# Patient Record
Sex: MALE | Race: WHITE | NOT HISPANIC OR LATINO | URBAN - METROPOLITAN AREA
[De-identification: names, ages, dates, MRNs, and addresses within clinical notes are randomized per-mention and may not be internally consistent; named-entity substitution may affect disease eponyms.]

---

## 2018-02-27 PROBLEM — Z00.00 ENCOUNTER FOR PREVENTIVE HEALTH EXAMINATION: Status: ACTIVE | Noted: 2018-02-27

## 2021-06-06 ENCOUNTER — EMERGENCY (EMERGENCY)
Facility: HOSPITAL | Age: 51
LOS: 1 days | Discharge: HOME | End: 2021-06-06
Attending: EMERGENCY MEDICINE | Admitting: EMERGENCY MEDICINE
Payer: COMMERCIAL

## 2021-06-06 VITALS
OXYGEN SATURATION: 98 % | DIASTOLIC BLOOD PRESSURE: 80 MMHG | HEIGHT: 64 IN | TEMPERATURE: 98 F | WEIGHT: 229.94 LBS | RESPIRATION RATE: 18 BRPM | HEART RATE: 78 BPM | SYSTOLIC BLOOD PRESSURE: 126 MMHG

## 2021-06-06 PROCEDURE — 71046 X-RAY EXAM CHEST 2 VIEWS: CPT

## 2021-06-06 PROCEDURE — 72100 X-RAY EXAM L-S SPINE 2/3 VWS: CPT

## 2021-06-06 PROCEDURE — 72040 X-RAY EXAM NECK SPINE 2-3 VW: CPT

## 2021-06-06 PROCEDURE — 99284 EMERGENCY DEPT VISIT MOD MDM: CPT

## 2021-06-06 PROCEDURE — 72100 X-RAY EXAM L-S SPINE 2/3 VWS: CPT | Mod: 26

## 2021-06-06 PROCEDURE — 99284 EMERGENCY DEPT VISIT MOD MDM: CPT | Mod: 25

## 2021-06-06 PROCEDURE — 93005 ELECTROCARDIOGRAM TRACING: CPT

## 2021-06-06 PROCEDURE — 71046 X-RAY EXAM CHEST 2 VIEWS: CPT | Mod: 26

## 2021-06-06 PROCEDURE — 72040 X-RAY EXAM NECK SPINE 2-3 VW: CPT | Mod: 26

## 2021-06-06 RX ORDER — IBUPROFEN 200 MG
1 TABLET ORAL
Qty: 30 | Refills: 0
Start: 2021-06-06 | End: 2021-06-15

## 2021-06-06 RX ORDER — IBUPROFEN 200 MG
600 TABLET ORAL ONCE
Refills: 0 | Status: COMPLETED | OUTPATIENT
Start: 2021-06-06 | End: 2021-06-06

## 2021-06-06 RX ORDER — CYCLOBENZAPRINE HYDROCHLORIDE 10 MG/1
1 TABLET, FILM COATED ORAL
Qty: 21 | Refills: 0
Start: 2021-06-06 | End: 2021-06-12

## 2021-06-06 RX ORDER — CYCLOBENZAPRINE HYDROCHLORIDE 10 MG/1
10 TABLET, FILM COATED ORAL ONCE
Refills: 0 | Status: COMPLETED | OUTPATIENT
Start: 2021-06-06 | End: 2021-06-06

## 2021-06-06 RX ADMIN — Medication 600 MILLIGRAM(S): at 11:54

## 2021-06-06 RX ADMIN — CYCLOBENZAPRINE HYDROCHLORIDE 10 MILLIGRAM(S): 10 TABLET, FILM COATED ORAL at 11:55

## 2021-06-06 RX ADMIN — Medication 600 MILLIGRAM(S): at 12:40
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
## 2021-06-06 NOTE — ED PROVIDER NOTE - PATIENT PORTAL LINK FT
You can access the FollowMyHealth Patient Portal offered by [facility] by registering at the following website: [email or web address]. By joining [employer]’s FollowMyHealth portal, you will also be able to view your health information using other applications (apps) compatible with our system.

## 2021-06-06 NOTE — ED ADULT TRIAGE NOTE - PATIENT ON (OXYGEN DELIVERY METHOD)
Impression: S/P Cataract Extraction by phacoemulsification with IOL placement OS - 7 Days. Presence of intraocular lens  Z96.1.  Post operative instructions reviewed - Plan: --Discontinue Ofloxacin 0.3%--Taper Lotemax TID x 1 wk, BID x 1wk, QD x 1wk, then d/c room air
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
## 2021-06-06 NOTE — ED ADULT NURSE NOTE - SUICIDE SCREENING QUESTION 2
Return call placed to April.  Reports onset of intermittent chest discomfort/ pressure for the last 3 weeks.  Denies chest trauma or injury.  Reports that 3-4 times per day she feels a mild discomfort/ pressure feeling to her chest which lasts a couple minutes. Also reports that every other day she gets a \"jolt feeling\" to her heart, which she describes as if someone is clenching her heart.  Reports this occurs 1-2 times per day and lasts only momentarily.  Denies associated cough/ wheezing/ shortness of breath/ weakness/ nausea or vomitting/ diaphoresis.  Denies swelling/ pain/ unusual warmth to her calves. Last \"jolting\" episode occurred about 1 hour ago.  Pt is feeling well at this time. States \"pretty good energy level\" but reports some increased tiredness.  Denies personal or family Hx of heart disease. Med list shows that pt is on oral BC.    Advised today appointment to evaluate this new onset of chest discomfort. Informed I will speak with Dr. Do about possible today end of appointment vs ER.  Pt states she prefers not to go to ER.  Assured pt I will call back. April agrees with plan.   No
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
## 2022-03-22 ENCOUNTER — EMERGENCY (EMERGENCY)
Facility: HOSPITAL | Age: 52
LOS: 1 days | Discharge: HOME | End: 2022-03-22
Attending: STUDENT IN AN ORGANIZED HEALTH CARE EDUCATION/TRAINING PROGRAM | Admitting: STUDENT IN AN ORGANIZED HEALTH CARE EDUCATION/TRAINING PROGRAM
Payer: COMMERCIAL

## 2022-03-22 VITALS
OXYGEN SATURATION: 100 % | HEART RATE: 68 BPM | TEMPERATURE: 98 F | DIASTOLIC BLOOD PRESSURE: 82 MMHG | SYSTOLIC BLOOD PRESSURE: 128 MMHG | RESPIRATION RATE: 18 BRPM

## 2022-03-22 VITALS
SYSTOLIC BLOOD PRESSURE: 153 MMHG | HEIGHT: 64 IN | OXYGEN SATURATION: 96 % | HEART RATE: 84 BPM | RESPIRATION RATE: 16 BRPM | TEMPERATURE: 98 F | DIASTOLIC BLOOD PRESSURE: 89 MMHG

## 2022-03-22 LAB
ALBUMIN SERPL ELPH-MCNC: 4.3 G/DL — SIGNIFICANT CHANGE UP (ref 3.3–5)
ALP SERPL-CCNC: 68 U/L — SIGNIFICANT CHANGE UP (ref 40–120)
ALT FLD-CCNC: 22 U/L — SIGNIFICANT CHANGE UP (ref 4–41)
ANION GAP SERPL CALC-SCNC: 11 MMOL/L — SIGNIFICANT CHANGE UP (ref 7–14)
APPEARANCE UR: ABNORMAL
AST SERPL-CCNC: 15 U/L — SIGNIFICANT CHANGE UP (ref 4–40)
BACTERIA # UR AUTO: ABNORMAL
BASOPHILS # BLD AUTO: 0.04 K/UL — SIGNIFICANT CHANGE UP (ref 0–0.2)
BASOPHILS NFR BLD AUTO: 0.5 % — SIGNIFICANT CHANGE UP (ref 0–2)
BILIRUB SERPL-MCNC: 0.6 MG/DL — SIGNIFICANT CHANGE UP (ref 0.2–1.2)
BILIRUB UR-MCNC: NEGATIVE — SIGNIFICANT CHANGE UP
BUN SERPL-MCNC: 20 MG/DL — SIGNIFICANT CHANGE UP (ref 7–23)
CALCIUM SERPL-MCNC: 8.9 MG/DL — SIGNIFICANT CHANGE UP (ref 8.4–10.5)
CHLORIDE SERPL-SCNC: 100 MMOL/L — SIGNIFICANT CHANGE UP (ref 98–107)
CO2 SERPL-SCNC: 25 MMOL/L — SIGNIFICANT CHANGE UP (ref 22–31)
COLOR SPEC: ABNORMAL
CREAT SERPL-MCNC: 0.99 MG/DL — SIGNIFICANT CHANGE UP (ref 0.5–1.3)
DIFF PNL FLD: ABNORMAL
EGFR: 91 ML/MIN/1.73M2 — SIGNIFICANT CHANGE UP
EOSINOPHIL # BLD AUTO: 0.22 K/UL — SIGNIFICANT CHANGE UP (ref 0–0.5)
EOSINOPHIL NFR BLD AUTO: 2.9 % — SIGNIFICANT CHANGE UP (ref 0–6)
EPI CELLS # UR: 1 /HPF — SIGNIFICANT CHANGE UP (ref 0–5)
GLUCOSE SERPL-MCNC: 121 MG/DL — HIGH (ref 70–99)
GLUCOSE UR QL: NEGATIVE — SIGNIFICANT CHANGE UP
HCT VFR BLD CALC: 47 % — SIGNIFICANT CHANGE UP (ref 39–50)
HGB BLD-MCNC: 15.1 G/DL — SIGNIFICANT CHANGE UP (ref 13–17)
HYALINE CASTS # UR AUTO: 0 /LPF — SIGNIFICANT CHANGE UP (ref 0–7)
IANC: 4.47 K/UL — SIGNIFICANT CHANGE UP (ref 1.5–8.5)
IMM GRANULOCYTES NFR BLD AUTO: 0.1 % — SIGNIFICANT CHANGE UP (ref 0–1.5)
KETONES UR-MCNC: ABNORMAL
LEUKOCYTE ESTERASE UR-ACNC: NEGATIVE — SIGNIFICANT CHANGE UP
LIDOCAIN IGE QN: 17 U/L — SIGNIFICANT CHANGE UP (ref 7–60)
LYMPHOCYTES # BLD AUTO: 1.94 K/UL — SIGNIFICANT CHANGE UP (ref 1–3.3)
LYMPHOCYTES # BLD AUTO: 26 % — SIGNIFICANT CHANGE UP (ref 13–44)
MCHC RBC-ENTMCNC: 26.5 PG — LOW (ref 27–34)
MCHC RBC-ENTMCNC: 32.1 GM/DL — SIGNIFICANT CHANGE UP (ref 32–36)
MCV RBC AUTO: 82.6 FL — SIGNIFICANT CHANGE UP (ref 80–100)
MONOCYTES # BLD AUTO: 0.79 K/UL — SIGNIFICANT CHANGE UP (ref 0–0.9)
MONOCYTES NFR BLD AUTO: 10.6 % — SIGNIFICANT CHANGE UP (ref 2–14)
NEUTROPHILS # BLD AUTO: 4.47 K/UL — SIGNIFICANT CHANGE UP (ref 1.8–7.4)
NEUTROPHILS NFR BLD AUTO: 59.9 % — SIGNIFICANT CHANGE UP (ref 43–77)
NITRITE UR-MCNC: NEGATIVE — SIGNIFICANT CHANGE UP
NRBC # BLD: 0 /100 WBCS — SIGNIFICANT CHANGE UP
NRBC # FLD: 0 K/UL — SIGNIFICANT CHANGE UP
PH UR: 6 — SIGNIFICANT CHANGE UP (ref 5–8)
PLATELET # BLD AUTO: 251 K/UL — SIGNIFICANT CHANGE UP (ref 150–400)
POTASSIUM SERPL-MCNC: 4.3 MMOL/L — SIGNIFICANT CHANGE UP (ref 3.5–5.3)
POTASSIUM SERPL-SCNC: 4.3 MMOL/L — SIGNIFICANT CHANGE UP (ref 3.5–5.3)
PROT SERPL-MCNC: 7.4 G/DL — SIGNIFICANT CHANGE UP (ref 6–8.3)
PROT UR-MCNC: ABNORMAL
RBC # BLD: 5.69 M/UL — SIGNIFICANT CHANGE UP (ref 4.2–5.8)
RBC # FLD: 14.2 % — SIGNIFICANT CHANGE UP (ref 10.3–14.5)
RBC CASTS # UR COMP ASSIST: SIGNIFICANT CHANGE UP /HPF (ref 0–4)
SODIUM SERPL-SCNC: 136 MMOL/L — SIGNIFICANT CHANGE UP (ref 135–145)
SP GR SPEC: 1.04 — SIGNIFICANT CHANGE UP (ref 1–1.05)
UROBILINOGEN FLD QL: SIGNIFICANT CHANGE UP
WBC # BLD: 7.47 K/UL — SIGNIFICANT CHANGE UP (ref 3.8–10.5)
WBC # FLD AUTO: 7.47 K/UL — SIGNIFICANT CHANGE UP (ref 3.8–10.5)
WBC UR QL: 3 /HPF — SIGNIFICANT CHANGE UP (ref 0–5)

## 2022-03-22 PROCEDURE — 99285 EMERGENCY DEPT VISIT HI MDM: CPT

## 2022-03-22 PROCEDURE — 74176 CT ABD & PELVIS W/O CONTRAST: CPT | Mod: 26,MA

## 2022-03-22 RX ORDER — MORPHINE SULFATE 50 MG/1
4 CAPSULE, EXTENDED RELEASE ORAL ONCE
Refills: 0 | Status: DISCONTINUED | OUTPATIENT
Start: 2022-03-22 | End: 2022-03-22

## 2022-03-22 RX ORDER — ACETAMINOPHEN 500 MG
650 TABLET ORAL ONCE
Refills: 0 | Status: COMPLETED | OUTPATIENT
Start: 2022-03-22 | End: 2022-03-22

## 2022-03-22 RX ORDER — SODIUM CHLORIDE 9 MG/ML
1000 INJECTION INTRAMUSCULAR; INTRAVENOUS; SUBCUTANEOUS ONCE
Refills: 0 | Status: COMPLETED | OUTPATIENT
Start: 2022-03-22 | End: 2022-03-22

## 2022-03-22 RX ORDER — TAMSULOSIN HYDROCHLORIDE 0.4 MG/1
1 CAPSULE ORAL
Qty: 30 | Refills: 0
Start: 2022-03-22 | End: 2022-04-20

## 2022-03-22 RX ORDER — OXYCODONE HYDROCHLORIDE 5 MG/1
1 TABLET ORAL
Qty: 8 | Refills: 0
Start: 2022-03-22 | End: 2022-03-23

## 2022-03-22 RX ADMIN — MORPHINE SULFATE 4 MILLIGRAM(S): 50 CAPSULE, EXTENDED RELEASE ORAL at 07:44

## 2022-03-22 RX ADMIN — Medication 650 MILLIGRAM(S): at 07:44

## 2022-03-22 RX ADMIN — SODIUM CHLORIDE 1000 MILLILITER(S): 9 INJECTION INTRAMUSCULAR; INTRAVENOUS; SUBCUTANEOUS at 06:42

## 2022-03-22 RX ADMIN — Medication 650 MILLIGRAM(S): at 06:41

## 2022-03-22 RX ADMIN — MORPHINE SULFATE 4 MILLIGRAM(S): 50 CAPSULE, EXTENDED RELEASE ORAL at 06:43

## 2022-03-22 NOTE — ED PROVIDER NOTE - PHYSICAL EXAMINATION
Linnette Amaya MD  GENERAL: Patient awake alert NAD.  HEENT: NC/AT, Moist mucous membranes, EOMI.  LUNGS: CTAB, no wheezes or crackles.   CARDIAC: RRR, no m/r/g.    ABDOMEN: Soft, NT, ND, No rebound, guarding. mild R CVA tenderness.   EXT: No edema.   MSK: no pain with movement, no deformities.  NEURO: A&Ox3. Moving all extremities.  SKIN: Warm and dry. No rash.  PSYCH: Normal affect. GENERAL: Patient awake alert NAD.  HEENT: NC/AT, Moist mucous membranes, EOMI.  LUNGS: CTAB, no wheezes or crackles.   CARDIAC: RRR, no m/r/g.    ABDOMEN: Soft, NT, ND, No rebound, guarding.   : +R CVA TTP, no suprapubic TTP.  EXT: No edema.   MSK: no pain with movement, no deformities.  NEURO: A&Ox3. Moving all extremities.  SKIN: Warm and dry. No rash.  PSYCH: Normal affect.

## 2022-03-22 NOTE — ED PROVIDER NOTE - PROGRESS NOTE DETAILS
Saad, PGY3: pt signed out to me pending ct. showed 5mm stone, pt feeling better after meds able to tolerat epo. given urology f/u, VSS. Time was taken to answer all of patients questions and concerns. Return precaution instructions were given and patient understands and feels comfortable with disposition.

## 2022-03-22 NOTE — ED PROVIDER NOTE - NS ED ROS FT
GENERAL: No fever, chills  EYES: no vision changes, no discharge.   ENT: no difficulty swallowing or speaking   CARDIAC: no chest pain/pressure, SOB, lower extremity swelling  PULMONARY: no cough, SOB  GI: no abdominal pain, v/d. +R flank pain, mild nausea  : no dysuria +hematuria  SKIN: no rashes  NEURO: no headache, lightheadedness, paresthesia  MSK: No joint pain, myalgia, weakness.

## 2022-03-22 NOTE — ED ADULT NURSE NOTE - OBJECTIVE STATEMENT
pt A&Ox3 c/o right flank pain that began yesterday associated with hematuria. pt denies hx of kidney stones. pt c/o abdominal discomfort described as "pressure". pt abdomen soft and nondistended. pt took allieve at home without relief. pt denies chest pain, sob. pt denies headache, nausea, vomiting, dizziness. pt normal sinus on monitor. 20G to the LAC. labs collected and sent. pt medicated as per md orders. pt awaiting CT at this time.

## 2022-03-22 NOTE — ED PROVIDER NOTE - PATIENT PORTAL LINK FT
You can access the FollowMyHealth Patient Portal offered by [facility] by registering at the following website: [email or web address]. By joining [employer]’s FollowMyHealth portal, you will also be able to view your health information using other applications (apps) compatible with our system.

## 2022-03-22 NOTE — ED PROVIDER NOTE - NSFOLLOWUPINSTRUCTIONS_ED_ALL_ED_FT
Kidney Stones    Kidney stones (urolithiasis) are crystal deposits that form inside your kidneys. Pain is caused by the stone moving through the urinary tract, causing spasms of the ureter. Drink enough water and fluids to keep your urine clear or pale yellow. This will help you to pass the stone or stone fragments. If provided a strainer, strain all urine and keep all particulate matter and stones for a follow up appointment with a urologist.    SEEK IMMEDIATE MEDICAL CARE IF YOU HAVE ANY OF THE FOLLOWING SYMPTOMS: pain not controlled with medication, fever/chills, worsening vomiting, inability to urinate, or dizziness/lightheadedness.    - Please follow up urine culture results    - Please take Flomax 0.4mg once a day to help pass stone    - Please urinate through strainer until you pass kidney stone

## 2022-03-22 NOTE — ED ADULT NURSE REASSESSMENT NOTE - NS ED NURSE REASSESS COMMENT FT1
pt A&Ox3. pt endorses relief from pain medication modalities. pt respirations even and unlabored with no accessory muscle use. pt O2 sat 100% on RA. pt vital signs as noted in flow sheet. pt in NAD and resting comfortably in stretcher at this time. pt awaiting CT at this time. will continue to monitor.

## 2022-03-22 NOTE — ED PROVIDER NOTE - OBJECTIVE STATEMENT
50 yo M who presents with flank pain. Started early this morning, throbbing in nature, increasing in pain, non radiating. Denies N/V or abd pain. Had 1 episode of gross hematuria. No dysuria or fevers. Denies testicle or penile pain or lesions. No chest pain or SOB. No personal or family h/o kidney stones. Took Alleve at home. 52 yo M who presents with flank pain. Started early this morning, throbbing in nature, increasing in pain, non radiating localized to the R flank. Denies N/V or abd pain. Had 1 episode of gross hematuria. No dysuria or fevers. Denies testicle or penile pain or lesions. No chest pain or SOB. No personal or family h/o kidney stones. Took Aleve at home. No other current complaints at this time.

## 2022-03-22 NOTE — ED PROVIDER NOTE - CLINICAL SUMMARY MEDICAL DECISION MAKING FREE TEXT BOX
Jose Luis - 50 yo M who presents with flank pain. Likely kidney stone, low suspicion infected stone or pyelo. Will check CT non con, blood work, urine. Pain control and fluids

## 2022-03-22 NOTE — ED PROVIDER NOTE - ATTENDING CONTRIBUTION TO CARE
I have personally performed a history and physical examination of the patient and discussed management with the resident as well as the patient.  I reviewed the resident's note and agree with the documented findings and plan of care.  I have authored and modified critical sections of the Provider Note, including but not limited to HPI, Physical Exam and MDM.    52 yo M who presents with flank pain. Started early this morning, throbbing in nature, increasing in pain, non radiating localized to the R flank. Denies N/V or abd pain. Had 1 episode of gross hematuria. Likely kidney stone. Less likely uti vs infected stone. Obtain cbc, cmp, ua, ct a/p, symptomatic control prn.

## 2022-03-22 NOTE — ED PROVIDER NOTE - NSFOLLOWUPCLINICS_GEN_ALL_ED_FT
[facility]  Urology  [address] [place] [address] - [address] [place] [address]  Phone: [phone]  Fax:   Follow Up Time: 4-6 Days

## 2022-03-23 LAB
CULTURE RESULTS: SIGNIFICANT CHANGE UP
SPECIMEN SOURCE: SIGNIFICANT CHANGE UP

## 2022-06-19 NOTE — ED ADULT NURSE NOTE - DRUG PRE-SCREENING (DAST -1)
F: Encourage PO intake  E: monitor Na, replete as needed  N: Regular  D: lovenox  Dispo; RMF Statement Selected

## 2022-09-09 ENCOUNTER — EMERGENCY (EMERGENCY)
Facility: HOSPITAL | Age: 52
LOS: 1 days | Discharge: HOME | End: 2022-09-09
Attending: EMERGENCY MEDICINE | Admitting: EMERGENCY MEDICINE
Payer: SELF-PAY

## 2022-09-09 VITALS
HEART RATE: 87 BPM | RESPIRATION RATE: 20 BRPM | DIASTOLIC BLOOD PRESSURE: 91 MMHG | OXYGEN SATURATION: 100 % | TEMPERATURE: 99 F | HEIGHT: 64 IN | SYSTOLIC BLOOD PRESSURE: 152 MMHG

## 2022-09-09 LAB
ALBUMIN SERPL ELPH-MCNC: 4.3 G/DL — SIGNIFICANT CHANGE UP (ref 3.3–5)
ALP SERPL-CCNC: 67 U/L — SIGNIFICANT CHANGE UP (ref 40–120)
ALT FLD-CCNC: 28 U/L — SIGNIFICANT CHANGE UP (ref 4–41)
ANION GAP SERPL CALC-SCNC: 11 MMOL/L — SIGNIFICANT CHANGE UP (ref 7–14)
APTT BLD: 29.6 SEC — SIGNIFICANT CHANGE UP (ref 27–36.3)
AST SERPL-CCNC: 15 U/L — SIGNIFICANT CHANGE UP (ref 4–40)
BASE EXCESS BLDV CALC-SCNC: 1.4 MMOL/L — SIGNIFICANT CHANGE UP (ref -2–3)
BASOPHILS # BLD AUTO: 0.03 K/UL — SIGNIFICANT CHANGE UP (ref 0–0.2)
BASOPHILS NFR BLD AUTO: 0.4 % — SIGNIFICANT CHANGE UP (ref 0–2)
BILIRUB SERPL-MCNC: 0.4 MG/DL — SIGNIFICANT CHANGE UP (ref 0.2–1.2)
BLOOD GAS VENOUS COMPREHENSIVE RESULT: SIGNIFICANT CHANGE UP
BUN SERPL-MCNC: 18 MG/DL — SIGNIFICANT CHANGE UP (ref 7–23)
CALCIUM SERPL-MCNC: 8.9 MG/DL — SIGNIFICANT CHANGE UP (ref 8.4–10.5)
CHLORIDE BLDV-SCNC: 103 MMOL/L — SIGNIFICANT CHANGE UP (ref 96–108)
CHLORIDE SERPL-SCNC: 102 MMOL/L — SIGNIFICANT CHANGE UP (ref 98–107)
CO2 BLDV-SCNC: 29.2 MMOL/L — HIGH (ref 22–26)
CO2 SERPL-SCNC: 25 MMOL/L — SIGNIFICANT CHANGE UP (ref 22–31)
CREAT SERPL-MCNC: 1.11 MG/DL — SIGNIFICANT CHANGE UP (ref 0.5–1.3)
EGFR: 79 ML/MIN/1.73M2 — SIGNIFICANT CHANGE UP
EOSINOPHIL # BLD AUTO: 0.28 K/UL — SIGNIFICANT CHANGE UP (ref 0–0.5)
EOSINOPHIL NFR BLD AUTO: 3.5 % — SIGNIFICANT CHANGE UP (ref 0–6)
FLUAV AG NPH QL: SIGNIFICANT CHANGE UP
FLUBV AG NPH QL: SIGNIFICANT CHANGE UP
GAS PNL BLDV: 135 MMOL/L — LOW (ref 136–145)
GLUCOSE BLDV-MCNC: 100 MG/DL — HIGH (ref 70–99)
GLUCOSE SERPL-MCNC: 104 MG/DL — HIGH (ref 70–99)
HCO3 BLDV-SCNC: 28 MMOL/L — SIGNIFICANT CHANGE UP (ref 22–29)
HCT VFR BLD CALC: 46.1 % — SIGNIFICANT CHANGE UP (ref 39–50)
HCT VFR BLDA CALC: 44 % — SIGNIFICANT CHANGE UP (ref 39–51)
HGB BLD CALC-MCNC: 14.6 G/DL — SIGNIFICANT CHANGE UP (ref 13–17)
HGB BLD-MCNC: 14.9 G/DL — SIGNIFICANT CHANGE UP (ref 13–17)
IANC: 4.04 K/UL — SIGNIFICANT CHANGE UP (ref 1.8–7.4)
IMM GRANULOCYTES NFR BLD AUTO: 0.1 % — SIGNIFICANT CHANGE UP (ref 0–1.5)
INR BLD: 0.99 RATIO — SIGNIFICANT CHANGE UP (ref 0.88–1.16)
LACTATE BLDV-MCNC: 1.2 MMOL/L — SIGNIFICANT CHANGE UP (ref 0.5–2)
LYMPHOCYTES # BLD AUTO: 2.76 K/UL — SIGNIFICANT CHANGE UP (ref 1–3.3)
LYMPHOCYTES # BLD AUTO: 34.8 % — SIGNIFICANT CHANGE UP (ref 13–44)
MCHC RBC-ENTMCNC: 26.3 PG — LOW (ref 27–34)
MCHC RBC-ENTMCNC: 32.3 GM/DL — SIGNIFICANT CHANGE UP (ref 32–36)
MCV RBC AUTO: 81.4 FL — SIGNIFICANT CHANGE UP (ref 80–100)
MONOCYTES # BLD AUTO: 0.8 K/UL — SIGNIFICANT CHANGE UP (ref 0–0.9)
MONOCYTES NFR BLD AUTO: 10.1 % — SIGNIFICANT CHANGE UP (ref 2–14)
NEUTROPHILS # BLD AUTO: 4.04 K/UL — SIGNIFICANT CHANGE UP (ref 1.8–7.4)
NEUTROPHILS NFR BLD AUTO: 51.1 % — SIGNIFICANT CHANGE UP (ref 43–77)
NRBC # BLD: 0 /100 WBCS — SIGNIFICANT CHANGE UP (ref 0–0)
NRBC # FLD: 0 K/UL — SIGNIFICANT CHANGE UP (ref 0–0)
PCO2 BLDV: 49 MMHG — SIGNIFICANT CHANGE UP (ref 42–55)
PH BLDV: 7.36 — SIGNIFICANT CHANGE UP (ref 7.32–7.43)
PLATELET # BLD AUTO: 270 K/UL — SIGNIFICANT CHANGE UP (ref 150–400)
PO2 BLDV: 32 MMHG — SIGNIFICANT CHANGE UP
POTASSIUM BLDV-SCNC: 3.7 MMOL/L — SIGNIFICANT CHANGE UP (ref 3.5–5.1)
POTASSIUM SERPL-MCNC: 4.1 MMOL/L — SIGNIFICANT CHANGE UP (ref 3.5–5.3)
POTASSIUM SERPL-SCNC: 4.1 MMOL/L — SIGNIFICANT CHANGE UP (ref 3.5–5.3)
PROT SERPL-MCNC: 7.2 G/DL — SIGNIFICANT CHANGE UP (ref 6–8.3)
PROTHROM AB SERPL-ACNC: 11.5 SEC — SIGNIFICANT CHANGE UP (ref 10.5–13.4)
RBC # BLD: 5.66 M/UL — SIGNIFICANT CHANGE UP (ref 4.2–5.8)
RBC # FLD: 13.6 % — SIGNIFICANT CHANGE UP (ref 10.3–14.5)
RSV RNA NPH QL NAA+NON-PROBE: SIGNIFICANT CHANGE UP
SAO2 % BLDV: 51.7 % — SIGNIFICANT CHANGE UP
SARS-COV-2 RNA SPEC QL NAA+PROBE: SIGNIFICANT CHANGE UP
SODIUM SERPL-SCNC: 138 MMOL/L — SIGNIFICANT CHANGE UP (ref 135–145)
TROPONIN T, HIGH SENSITIVITY RESULT: <6 NG/L — SIGNIFICANT CHANGE UP
WBC # BLD: 7.92 K/UL — SIGNIFICANT CHANGE UP (ref 3.8–10.5)
WBC # FLD AUTO: 7.92 K/UL — SIGNIFICANT CHANGE UP (ref 3.8–10.5)

## 2022-09-09 PROCEDURE — 99218: CPT | Mod: 25

## 2022-09-09 PROCEDURE — 70496 CT ANGIOGRAPHY HEAD: CPT | Mod: 26,MA

## 2022-09-09 PROCEDURE — 93010 ELECTROCARDIOGRAM REPORT: CPT

## 2022-09-09 PROCEDURE — 70498 CT ANGIOGRAPHY NECK: CPT | Mod: 26,MA

## 2022-09-09 NOTE — ED CDU PROVIDER INITIAL DAY NOTE - NS ED ATTENDING STATEMENT MOD
This was a shared visit with the ELLY. I reviewed and verified the documentation and independently performed the documented:

## 2022-09-09 NOTE — ED PROVIDER NOTE - OBJECTIVE STATEMENT
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 Woke up at 5am, no complaints, went to work, approximately at 1pm started to endorse left arm tingling. no weakness. no headache no n/v/d no slurred speech. no cp no abdominal pain. no trauma. Hasn't been on BP meds for min 6months, ran out of meds.   Family hx of CVA.   Code stroke called in triage, neurology w pt at bedside

## 2022-09-09 NOTE — ED ADULT TRIAGE NOTE - CHIEF COMPLAINT QUOTE
Pt with PMH of HTN and has not taken his BP medicine for 4-5 months. Amlodipine and Losartan.. Has left arm and shoulder numbness since 1pm. c/o feeling not all there mentally. alert and oriented x4, ambulatory, code stroke activated.

## 2022-09-09 NOTE — ED CDU PROVIDER INITIAL DAY NOTE - ATTENDING APP SHARED VISIT CONTRIBUTION OF CARE
Attending Statement: I have reviewed and agree with all pertinent clinical information, including history and physical exam and agree with treatment plan of the PA, except as noted.  52yoM hx of HTN pw  with tingling of left arm today. Woke up at 5am, no complaints, went to work, approximately at 1pm started to endorse left arm tingling. no weakness. no headache no n/v/d no slurred speech. no cp no abdominal pain. no trauma. Hasn't been on BP meds for min 6months, ran out of meds.   Family hx of CVA.   nontoxic male. no facial asymmetry no slurred speech supple neck.  normal S1-S2 HR87 No resp distress. able to speak in full and clear sentences. no wheeze, rales or stridor.soft nontender abdomen. no  rebound. no guarding. no sign of trauma. no CVAT   AOx3, no focal deficits. CN 2-12 grossly intact. nl gait. no meningeal signs. nl finger to nose,  nl tandem gait  plan MRI, neurology following pt

## 2022-09-09 NOTE — ED ADULT NURSE NOTE - OBJECTIVE STATEMENT
PIERRE RN: pt. received to CT Area as a Code Stroke, A&Ox4, ambulatory cares for self LKW approx 1300 today, with L sided upper extremity weakness. pt. endorses this has not happened before. No other neurological deficits noted. Family Hx of CVA noted. NAD noted. 18g IV placed in the R AC. Labs sent with code stroke sticker attached. pt wheeled to hallway spot 7A. report endorses the primary RN.

## 2022-09-09 NOTE — CONSULT NOTE ADULT - ATTENDING COMMENTS
< from: MR Cervical Spine No Cont (09.10.22 @ 10:14) >    IMPRESSION:    Brain MRI: No acute infarct.    Cervical spine MRI: Multilevel degenerative changes with severe bilateral   neural foraminal stenosis C5-6 and C6-7.    < end of copied text >      A/P  Mr. Littlejohn is a 53 yo man with left cervical radiculopathy with no neurological deficits - only hyperesthesias in the left arm in limited areas.   No infarct on MRI brain.  Neurology f/u as outpatient.   We counselled the patient on the importance of vascular risk factor modification.   D/W patient and CDU MD    Thank you

## 2022-09-09 NOTE — ED CDU PROVIDER INITIAL DAY NOTE - PHYSICAL EXAMINATION
CONSTITUTIONAL:  Well appearing, awake, alert, oriented to person, place, time/situation and in no apparent distress.  Pt. is objectively comfortable appearing and verbalizing in full, clear, effortless sentences.  ENMT: NC/AT.  Airway patent.  Nasal mucosa clear.  Moist mucous membranes.  Neck supple.  EYES:  Clear OU.  CARDIAC:  Normal rate, regular rhythm.  Heart sounds S1 S2.  No murmurs, gallops, or rubs.  RESPIRATORY:  Breath sounds clear and equal bilaterally.  No wheezes, no rales, no rhonchi.  GASTROINTESTINAL:  Abdomen soft, non-distended, non-tender.  No rebound, no guarding.  NEUROLOGICAL:  Alert and oriented to person/place/time/situation.  No focal deficits; no motor deficits.  No tremors noted.   MUSCULOSKELETAL:  Range of motion is not limited.  SKIN:  Skin color unremarkable.  Skin warm, dry, and intact.    PSYCHIATRIC:  Alert and oriented to person/place/time/situation.  Mood and affect WNL.  No apparent risk to self or others.

## 2022-09-09 NOTE — ED CDU PROVIDER INITIAL DAY NOTE - MEDICAL DECISION MAKING DETAILS
Tele monitoring, neuro checks, MRI head/c-spine, additional recs as per Neuro team following patient; supportive care, general observation care / monitoring.

## 2022-09-09 NOTE — ED CDU PROVIDER INITIAL DAY NOTE - CROS ED NEURO NEG
no changes in vision or speech./no headache/no difficulty walking/imbalance/no seizures/no change in level of consciousness

## 2022-09-09 NOTE — ED PROVIDER NOTE - PROGRESS NOTE DETAILS
neurology recommend cdu for MRI> pt has no contra indication to MRI. symptoms improved. pt willing to stay in CDU  pt and family updated

## 2022-09-09 NOTE — ED PROVIDER NOTE - PHYSICAL EXAMINATION
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 no facial asymmetry no slurred speech supple neck.  normal S1-S2 HR87   No resp distress. able to speak in full and clear sentences. no wheeze, rales or stridor.  soft nontender abdomen. no  rebound. no guarding. no sign of trauma. no CVAT   AOx3, no focal deficits. CN 2-12 grossly intact. nl gait. no meningeal signs. nl finger to nose,  nl tandem gait Dr Umana    nontoxic male. no facial asymmetry no slurred speech supple neck.  normal S1-S2 HR87   No resp distress. able to speak in full and clear sentences. no wheeze, rales or stridor.  soft nontender abdomen. no  rebound. no guarding. no sign of trauma. no CVAT   AOx3, no focal deficits. CN 2-12 grossly intact. nl gait. no meningeal signs. nl finger to nose,  nl tandem gait

## 2022-09-09 NOTE — CONSULT NOTE ADULT - ASSESSMENT
52M w/ HTN (noncompliant with medications), R nephrolithiasis presents with LUE numbness and paresthesias. Around 1pm noticed LUE paresthesias described as a "crawling" sensation. Pt reports some neck discomfort but no pain, no back pain, no urinary or bowel incontinence, no speech or vision changes. Pt reports taking amlodipine and losartan (which he normally is not compliant with) when the symptoms occurred. Pt also reports recurrence of his R flank pain. Neuro exam w/ hyperesthesia of LUE. . CTH/CTA neg.     Impression: LUE paresthesias w/ neck discomfort in pt with vascular RFs, possibly 2/2 stroke vs. cervical myelopathy vs. metabolic derangements    Recommendations:  [] MRI brain w/o contrast  [] MRI C spine w/o contrast  [] start ASA 81 mg PO daily; discontinue if MRI negative for stroke  [] start atorvastatin 80mg PO daily (titrate to LDL < 70)   [] stroke risk factor modification and counseling   [] check HA1c, lipid panel, Utox, Mg, Phos, TSH, B12, folate, NICKO, HIV  [] if no improvement of symptoms, consider outpatient EMG/NCS    Case discussed with telestroke attending Dr. Cisneros  Case to be seen and discussed with Dr. Piña in AM   52M w/ HTN (noncompliant with medications), R nephrolithiasis presents with LUE numbness and paresthesias. Around 1pm noticed LUE paresthesias described as a "crawling" sensation. Pt reports some neck discomfort but no pain, no back pain, no urinary or bowel incontinence, no speech or vision changes. Pt reports taking amlodipine and losartan (which he normally is not compliant with) when the symptoms occurred. Pt also reports recurrence of his R flank pain. Neuro exam w/ hyperesthesia of LUE, R crossed adductor. . CTH/CTA neg.     Impression: LUE paresthesias w/ neck discomfort in pt with vascular RFs, possibly 2/2 stroke vs. cervical myelopathy vs. metabolic derangements    Recommendations:  [] MRI brain w/o contrast  [] MRI C spine w/o contrast  [] start ASA 81 mg PO daily; discontinue if MRI negative for stroke  [] start atorvastatin 80mg PO daily (titrate to LDL < 70)   [] stroke risk factor modification and counseling   [] check HA1c, lipid panel, Utox, Mg, Phos, TSH, B12, folate, NICKO, HIV  [] if no improvement of symptoms, consider outpatient EMG/NCS    Case discussed with telestroke attending Dr. Cisneros  Case to be seen and discussed with Dr. Piña in AM

## 2022-09-09 NOTE — ED CDU PROVIDER INITIAL DAY NOTE - OBJECTIVE STATEMENT
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 Woke up at 5am, no complaints, went to work, approximately at 1pm started to endorse left arm tingling. no weakness. no headache no n/v/d no slurred speech. no cp no abdominal pain. no trauma. Hasn't been on BP meds for min 6months, ran out of meds.   Family hx of CVA.   Code stroke called in triage, neurology w pt at bedside    CDU ZAHRA Hightower Note-----  ED Provider HPI as above, reviewed.  Pt is a 53 yo male, PMH HTN (non-adherent with meds), RICHA, nephrolithiasis; pt presented to the ED c/o LUE numbness/paresthesias, onset ~1300 hrs on 9/9/22.  Pt reported some neck discomfort at time; denied chest discomfort, SOB, abdominal pain, nausea, vomiting, syncope.  In the ED, VSS, code stroke was called.  CBC/CMP unremarkable, INR 0.99, trop <6, lactate 1.2.  COVID/FLU/RSV: NotDetected.  CT head / CTA head/neck were performed; no acute pathology noted.  Pt passed dysphagia screen.  Neurology advised MRI head/c-spine; pt was dispo'd to CDU for continued care plan:  Tele monitoring, neuro checks, MRI head/c-spine, additional recs as per Neuro team following patient; supportive care, general observation care / monitoring.

## 2022-09-09 NOTE — ED CDU PROVIDER INITIAL DAY NOTE - NSICDXPASTMEDICALHX_GEN_ALL_CORE_FT
PAST MEDICAL HISTORY:  HTN (hypertension) (non-adherent with meds)    Nephrolithiasis     RICHA (obstructive sleep apnea)

## 2022-09-09 NOTE — CONSULT NOTE ADULT - SUBJECTIVE AND OBJECTIVE BOX
HPI:  52M w/ HTN (noncompliant with medications), R nephrolithiasis presents with LUE numbness and paresthesias. Pt reports waking up normally at 5am and around 1pm noticed LUE paresthesias described as a "crawling" sensation. Pt reports some neck discomfort but no pain, no back pain, no urinary or bowel incontinence, no speech or vision changes. Pt reports taking amlodipine and losartan (which he normally is not compliant with) when the symptoms occurred. Pt also reports recurrence of his R flank pain. Not on antiplatelets or anticoagulants. At baseline, pt ambulates without assistance or assistive devices, does all ADLs independently.     (Stroke only)  LKN: 9/9/22 at 05:00  NIHSS: 0  preMRS: 0   Pt is not a candidate for tpa due to outside tpa window  Pt is not a candidate for mechanical thrombectomy due to no large vessel occlusion on CTA    REVIEW OF SYSTEMS    A 10-system ROS was performed and is negative except for those items noted above and/or in the HPI.    PAST MEDICAL & SURGICAL HISTORY:  No pertinent past medical history      No significant past surgical history        FAMILY HISTORY:    SOCIAL HISTORY:   T/E/D:   Occupation:   Lives with:     MEDICATIONS (HOME):  Home Medications:  amLODIPine:  (07 Jun 2021 11:21)  losartan:  (07 Jun 2021 11:21)    MEDICATIONS  (STANDING):    MEDICATIONS  (PRN):    ALLERGIES/INTOLERANCES:  Allergies  No Known Allergies    Intolerances    VITALS & EXAMINATION:  Vital Signs Last 24 Hrs  T(C): 37.1 (09 Sep 2022 21:25), Max: 37.1 (09 Sep 2022 21:25)  T(F): 98.7 (09 Sep 2022 21:25), Max: 98.7 (09 Sep 2022 21:25)  HR: 87 (09 Sep 2022 21:25) (87 - 87)  BP: 152/91 (09 Sep 2022 21:25) (152/91 - 152/91)  BP(mean): --  RR: 20 (09 Sep 2022 21:25) (20 - 20)  SpO2: 100% (09 Sep 2022 21:25) (100% - 100%)    Parameters below as of 09 Sep 2022 21:25  Patient On (Oxygen Delivery Method): room air      General:  Constitutional: Obese Male, appears stated age, in no apparent distress including pain  Head: Normocephalic & atraumatic.  Respiratory: No increased work of breathing  Extremities: No cyanosis, clubbing, or edema.  Skin: No rashes, bruising, or discoloration.    Neurological (>12):  MS: Awake, alert, oriented to person, place, situation, time. Normal affect. Follows all commands.    Language: Speech is clear, fluent with good repetition & comprehension (able to name objects___)    CNs: PERRL (R = 2mm, L = 2mm). VFF. EOMI no nystagmus, no diplopia. V1-3 intact to LT/pinprick, well developed masseter muscles b/l. No facial asymmetry b/l, full eye closure strength b/l. Hearing grossly normal (rubbing fingers) b/l. Symmetric palate elevation in midline. Gag reflex deferred. Head turning & shoulder shrug intact b/l. Tongue midline, normal movements, no atrophy.    Motor: Normal muscle bulk & tone. No noticeable tremor or seizure. No pronator drift.              Deltoid	Biceps	Triceps	Wrist	Finger ABd	   R	5	5	5	5	5		5 	  L	5	5	5	5	5		5    	H-Flex	K-Flex	K-Ext	D-Flex	P-Flex  R	5	5	5	5	5	  L	5	5	5	5	5	     Sensation: Intact to LT/PP/Temp/Vibration/Position b/l throughout. Pt reports paresthesias on entire LUE, all L fingers, circumferentially, but no clear sensory loss.    Cortical: Extinction on DSS (neglect): none    Reflexes: +b/l suprapatellars, no ankle clonus b/l              Biceps(C5)       BR(C6)     Triceps(C7)               Patellar(L4)    Achilles(S1)    Plantar Resp  R	2	          2	             2		        3		    2		Down   L	2	          2	             2		        3		    2		Down     Coordination: intact rapid-alt movements. No dysmetria to FTN    Gait: Normal Romberg. No postural instability. Normal stance and tandem gait.     LABORATORY:  CBC                       14.9   7.92  )-----------( 270      ( 09 Sep 2022 21:45 )             46.1     Chem 09-09    138  |  102  |  18  ----------------------------<  104<H>  4.1   |  25  |  1.11    Ca    8.9      09 Sep 2022 21:45    TPro  7.2  /  Alb  4.3  /  TBili  0.4  /  DBili  x   /  AST  15  /  ALT  28  /  AlkPhos  67  09-09    LFTs LIVER FUNCTIONS - ( 09 Sep 2022 21:45 )  Alb: 4.3 g/dL / Pro: 7.2 g/dL / ALK PHOS: 67 U/L / ALT: 28 U/L / AST: 15 U/L / GGT: x           Coagulopathy PT/INR - ( 09 Sep 2022 21:45 )   PT: 11.5 sec;   INR: 0.99 ratio         PTT - ( 09 Sep 2022 21:45 )  PTT:29.6 sec    STUDIES & IMAGING:  Studies (EKG, EEG, EMG, etc):     Radiology (XR, CT, MR, U/S, TTE/EMILIANO):    < from: CT Brain Stroke Protocol (09.09.22 @ 22:00) >  FINDINGS:    There is no acute intracranial hemorrhage or mass effect. Gray-white   differentiation is preserved.  No extra-axial collection.  Ventricles and sulci are normal in size for the patient's age without   hydrocephalus. Basal cisterns are patent.  Visualized paranasal sinuses and mastoid air cells are clear.  Calvarium is intact.    IMPRESSION:    No acute intracranial hemorrhage or mass effect.    < end of copied text >  < from: CT Angio Brain Stroke Protocol  w/ IV Cont (09.09.22 @ 22:00) >  FINDINGS:    Study issomewhat limited by suboptimal contrast bolus timing.    CTA NECK:    There is no internal carotid artery stenosis by NASCET criteria. 0%   stenosis is noted. No carotid artery dissection.  There is no evidence for vertebral artery stenosis or dissection.  Aortic arch and vertebral artery origins are unremarkable.    CTA HEAD:    There is no evidence for focal stenosis, major vessel occlusion, or   aneurysm about the [place]. Tiny aneurysms can be beyond the   resolution of CTA imaging technique.    IMPRESSION:    CTA NECK: No evidence of hemodynamically significant stenosis using   NASCET criteria. No evidence of arterial dissection.    CTA BRAIN: No evidence of major vessel occlusion.    < end of copied text >   HPI:  52M w/ HTN (noncompliant with medications), R nephrolithiasis presents with LUE numbness and paresthesias. Pt reports waking up normally at 5am and around 1pm noticed LUE paresthesias described as a "crawling" sensation, whole arm but more prominent distally. Pt reports some neck discomfort but no pain, no back pain, no urinary or bowel incontinence, no speech or vision changes. Pt reports taking amlodipine and losartan (which he normally is not compliant with) when the symptoms occurred. Pt also reports recurrence of his R flank pain. Not on antiplatelets or anticoagulants. At baseline, pt ambulates without assistance or assistive devices, does all ADLs independently.     (Stroke only)  LKN: 9/9/22 at 05:00  NIHSS: 0  preMRS: 0   Pt is not a candidate for tpa due to outside tpa window  Pt is not a candidate for mechanical thrombectomy due to no large vessel occlusion on CTA    REVIEW OF SYSTEMS    A 10-system ROS was performed and is negative except for those items noted above and/or in the HPI.    PAST MEDICAL & SURGICAL HISTORY:  No pertinent past medical history      No significant past surgical history        FAMILY HISTORY:    SOCIAL HISTORY:   T/E/D:   Occupation:   Lives with:     MEDICATIONS (HOME):  Home Medications:  amLODIPine:  (07 Jun 2021 11:21)  losartan:  (07 Jun 2021 11:21)    MEDICATIONS  (STANDING):    MEDICATIONS  (PRN):    ALLERGIES/INTOLERANCES:  Allergies  No Known Allergies    Intolerances    VITALS & EXAMINATION:  Vital Signs Last 24 Hrs  T(C): 37.1 (09 Sep 2022 21:25), Max: 37.1 (09 Sep 2022 21:25)  T(F): 98.7 (09 Sep 2022 21:25), Max: 98.7 (09 Sep 2022 21:25)  HR: 87 (09 Sep 2022 21:25) (87 - 87)  BP: 152/91 (09 Sep 2022 21:25) (152/91 - 152/91)  BP(mean): --  RR: 20 (09 Sep 2022 21:25) (20 - 20)  SpO2: 100% (09 Sep 2022 21:25) (100% - 100%)    Parameters below as of 09 Sep 2022 21:25  Patient On (Oxygen Delivery Method): room air      General:  Constitutional: Obese Male, appears stated age, in no apparent distress including pain  Head: Normocephalic & atraumatic.  Respiratory: No increased work of breathing  Extremities: No cyanosis, clubbing, or edema.  Skin: No rashes, bruising, or discoloration.    Neurological (>12):  MS: Awake, alert, oriented to person, place, situation, time. Normal affect. Follows all commands.    Language: Speech is clear, fluent with good repetition & comprehension (able to name objects mask, thumb)    CNs: PERRL (R = 2mm, L = 2mm). VFF. EOMI no nystagmus, no diplopia. V1-3 intact to LT/pinprick, well developed masseter muscles b/l. No facial asymmetry b/l, full eye closure strength b/l. Hearing grossly normal (rubbing fingers) b/l. Symmetric palate elevation in midline. Gag reflex deferred. Head turning & shoulder shrug intact b/l. Tongue midline, normal movements, no atrophy.    Motor: Normal muscle bulk & tone. No noticeable tremor or seizure. No pronator drift.              Deltoid	Biceps	Triceps	Wrist	Finger ABd	   R	5	5	5	5	5		5 	  L	5	5	5	5	5		5    	H-Flex	K-Flex	K-Ext	D-Flex	P-Flex  R	5	5	5	5	5	  L	5	5	5	5	5	     Sensation: Intact to LT/PP/Temp/Vibration/Position b/l throughout. Pt reports paresthesias on entire LUE, all L fingers, circumferentially, but no clear sensory loss.    Cortical: Extinction on DSS (neglect): none    Reflexes: +b/l suprapatellars, no ankle clonus b/l, R crossed adductor              Biceps(C5)       BR(C6)     Triceps(C7)               Patellar(L4)    Achilles(S1)    Plantar Resp  R	2	          2	             2		        3		    2		Down   L	2	          2	             2		        3		    2		Down     Coordination: intact rapid-alt movements. No dysmetria to FTN    Gait: Normal Romberg. No postural instability. Normal stance and tandem gait.     LABORATORY:  CBC                       14.9   7.92  )-----------( 270      ( 09 Sep 2022 21:45 )             46.1     Chem 09-09    138  |  102  |  18  ----------------------------<  104<H>  4.1   |  25  |  1.11    Ca    8.9      09 Sep 2022 21:45    TPro  7.2  /  Alb  4.3  /  TBili  0.4  /  DBili  x   /  AST  15  /  ALT  28  /  AlkPhos  67  09-09    LFTs LIVER FUNCTIONS - ( 09 Sep 2022 21:45 )  Alb: 4.3 g/dL / Pro: 7.2 g/dL / ALK PHOS: 67 U/L / ALT: 28 U/L / AST: 15 U/L / GGT: x           Coagulopathy PT/INR - ( 09 Sep 2022 21:45 )   PT: 11.5 sec;   INR: 0.99 ratio         PTT - ( 09 Sep 2022 21:45 )  PTT:29.6 sec    STUDIES & IMAGING:  Studies (EKG, EEG, EMG, etc):     Radiology (XR, CT, MR, U/S, TTE/EMILIANO):    < from: CT Brain Stroke Protocol (09.09.22 @ 22:00) >  FINDINGS:    There is no acute intracranial hemorrhage or mass effect. Gray-white   differentiation is preserved.  No extra-axial collection.  Ventricles and sulci are normal in size for the patient's age without   hydrocephalus. Basal cisterns are patent.  Visualized paranasal sinuses and mastoid air cells are clear.  Calvarium is intact.    IMPRESSION:    No acute intracranial hemorrhage or mass effect.    < end of copied text >  < from: CT Angio Brain Stroke Protocol  w/ IV Cont (09.09.22 @ 22:00) >  FINDINGS:    Study issomewhat limited by suboptimal contrast bolus timing.    CTA NECK:    There is no internal carotid artery stenosis by NASCET criteria. 0%   stenosis is noted. No carotid artery dissection.  There is no evidence for vertebral artery stenosis or dissection.  Aortic arch and vertebral artery origins are unremarkable.    CTA HEAD:    There is no evidence for focal stenosis, major vessel occlusion, or   aneurysm about the [place]. Tiny aneurysms can be beyond the   resolution of CTA imaging technique.    IMPRESSION:    CTA NECK: No evidence of hemodynamically significant stenosis using   NASCET criteria. No evidence of arterial dissection.    CTA BRAIN: No evidence of major vessel occlusion.    < end of copied text >   HPI:  52M w/ HTN (noncompliant with medications), RICHA, R nephrolithiasis presents with LUE numbness and paresthesias. Pt reports waking up normally at 5am and around 1pm noticed LUE paresthesias described as a "crawling" sensation, whole arm but more prominent distally. Pt reports some neck discomfort but no pain, no back pain, no urinary or bowel incontinence, no speech or vision changes. Pt reports taking amlodipine and losartan (which he normally is not compliant with) when the symptoms occurred. Pt also reports recurrence of his R flank pain. Not on antiplatelets or anticoagulants. At baseline, pt ambulates without assistance or assistive devices, does all ADLs independently.     (Stroke only)  LKN: 9/9/22 at 05:00  NIHSS: 0  preMRS: 0   Pt is not a candidate for tpa due to outside tpa window  Pt is not a candidate for mechanical thrombectomy due to no large vessel occlusion on CTA    REVIEW OF SYSTEMS    A 10-system ROS was performed and is negative except for those items noted above and/or in the HPI.    PAST MEDICAL & SURGICAL HISTORY:  No pertinent past medical history      No significant past surgical history        FAMILY HISTORY:    SOCIAL HISTORY:   T/E/D:   Occupation:   Lives with:     MEDICATIONS (HOME):  Home Medications:  amLODIPine:  (07 Jun 2021 11:21)  losartan:  (07 Jun 2021 11:21)    MEDICATIONS  (STANDING):    MEDICATIONS  (PRN):    ALLERGIES/INTOLERANCES:  Allergies  No Known Allergies    Intolerances    VITALS & EXAMINATION:  Vital Signs Last 24 Hrs  T(C): 37.1 (09 Sep 2022 21:25), Max: 37.1 (09 Sep 2022 21:25)  T(F): 98.7 (09 Sep 2022 21:25), Max: 98.7 (09 Sep 2022 21:25)  HR: 87 (09 Sep 2022 21:25) (87 - 87)  BP: 152/91 (09 Sep 2022 21:25) (152/91 - 152/91)  BP(mean): --  RR: 20 (09 Sep 2022 21:25) (20 - 20)  SpO2: 100% (09 Sep 2022 21:25) (100% - 100%)    Parameters below as of 09 Sep 2022 21:25  Patient On (Oxygen Delivery Method): room air      General:  Constitutional: Obese Male, appears stated age, in no apparent distress including pain  Head: Normocephalic & atraumatic.  Respiratory: No increased work of breathing  Extremities: No cyanosis, clubbing, or edema.  Skin: No rashes, bruising, or discoloration.    Neurological (>12):  MS: Awake, alert, oriented to person, place, situation, time. Normal affect. Follows all commands.    Language: Speech is clear, fluent with good repetition & comprehension (able to name objects mask, thumb)    CNs: PERRL (R = 2mm, L = 2mm). VFF. EOMI no nystagmus, no diplopia. V1-3 intact to LT/pinprick, well developed masseter muscles b/l. No facial asymmetry b/l, full eye closure strength b/l. Hearing grossly normal (rubbing fingers) b/l. Symmetric palate elevation in midline. Gag reflex deferred. Head turning & shoulder shrug intact b/l. Tongue midline, normal movements, no atrophy.    Motor: Normal muscle bulk & tone. No noticeable tremor or seizure. No pronator drift.              Deltoid	Biceps	Triceps	Wrist	Finger ABd	   R	5	5	5	5	5		5 	  L	5	5	5	5	5		5    	H-Flex	K-Flex	K-Ext	D-Flex	P-Flex  R	5	5	5	5	5	  L	5	5	5	5	5	     Sensation: Intact to LT/PP/Temp/Vibration/Position b/l throughout. Pt reports paresthesias on entire LUE, all L fingers, circumferentially, but no clear sensory loss.    Cortical: Extinction on DSS (neglect): none    Reflexes: +b/l suprapatellars, no ankle clonus b/l, R crossed adductor              Biceps(C5)       BR(C6)     Triceps(C7)               Patellar(L4)    Achilles(S1)    Plantar Resp  R	2	          2	             2		        3		    2		Down   L	2	          2	             2		        3		    2		Down     Coordination: intact rapid-alt movements. No dysmetria to FTN    Gait: Normal Romberg. No postural instability. Normal stance and tandem gait.     LABORATORY:  CBC                       14.9   7.92  )-----------( 270      ( 09 Sep 2022 21:45 )             46.1     Chem 09-09    138  |  102  |  18  ----------------------------<  104<H>  4.1   |  25  |  1.11    Ca    8.9      09 Sep 2022 21:45    TPro  7.2  /  Alb  4.3  /  TBili  0.4  /  DBili  x   /  AST  15  /  ALT  28  /  AlkPhos  67  09-09    LFTs LIVER FUNCTIONS - ( 09 Sep 2022 21:45 )  Alb: 4.3 g/dL / Pro: 7.2 g/dL / ALK PHOS: 67 U/L / ALT: 28 U/L / AST: 15 U/L / GGT: x           Coagulopathy PT/INR - ( 09 Sep 2022 21:45 )   PT: 11.5 sec;   INR: 0.99 ratio         PTT - ( 09 Sep 2022 21:45 )  PTT:29.6 sec    STUDIES & IMAGING:  Studies (EKG, EEG, EMG, etc):     Radiology (XR, CT, MR, U/S, TTE/EMILIANO):    < from: CT Brain Stroke Protocol (09.09.22 @ 22:00) >  FINDINGS:    There is no acute intracranial hemorrhage or mass effect. Gray-white   differentiation is preserved.  No extra-axial collection.  Ventricles and sulci are normal in size for the patient's age without   hydrocephalus. Basal cisterns are patent.  Visualized paranasal sinuses and mastoid air cells are clear.  Calvarium is intact.    IMPRESSION:    No acute intracranial hemorrhage or mass effect.    < end of copied text >  < from: CT Angio Brain Stroke Protocol  w/ IV Cont (09.09.22 @ 22:00) >  FINDINGS:    Study issomewhat limited by suboptimal contrast bolus timing.    CTA NECK:    There is no internal carotid artery stenosis by NASCET criteria. 0%   stenosis is noted. No carotid artery dissection.  There is no evidence for vertebral artery stenosis or dissection.  Aortic arch and vertebral artery origins are unremarkable.    CTA HEAD:    There is no evidence for focal stenosis, major vessel occlusion, or   aneurysm about the [place]. Tiny aneurysms can be beyond the   resolution of CTA imaging technique.    IMPRESSION:    CTA NECK: No evidence of hemodynamically significant stenosis using   NASCET criteria. No evidence of arterial dissection.    CTA BRAIN: No evidence of major vessel occlusion.    < end of copied text >

## 2022-09-10 VITALS
HEART RATE: 84 BPM | TEMPERATURE: 98 F | OXYGEN SATURATION: 99 % | SYSTOLIC BLOOD PRESSURE: 141 MMHG | DIASTOLIC BLOOD PRESSURE: 86 MMHG

## 2022-09-10 LAB
A1C WITH ESTIMATED AVERAGE GLUCOSE RESULT: 6.2 % — HIGH (ref 4–5.6)
ANION GAP SERPL CALC-SCNC: 12 MMOL/L — SIGNIFICANT CHANGE UP (ref 7–14)
BUN SERPL-MCNC: 22 MG/DL — SIGNIFICANT CHANGE UP (ref 7–23)
CALCIUM SERPL-MCNC: 8.9 MG/DL — SIGNIFICANT CHANGE UP (ref 8.4–10.5)
CHLORIDE SERPL-SCNC: 105 MMOL/L — SIGNIFICANT CHANGE UP (ref 98–107)
CHOLEST SERPL-MCNC: 153 MG/DL — SIGNIFICANT CHANGE UP
CO2 SERPL-SCNC: 21 MMOL/L — LOW (ref 22–31)
CREAT SERPL-MCNC: 0.83 MG/DL — SIGNIFICANT CHANGE UP (ref 0.5–1.3)
EGFR: 104 ML/MIN/1.73M2 — SIGNIFICANT CHANGE UP
ESTIMATED AVERAGE GLUCOSE: 131 — SIGNIFICANT CHANGE UP
FOLATE SERPL-MCNC: 6.8 NG/ML — SIGNIFICANT CHANGE UP (ref 3.1–17.5)
GLUCOSE SERPL-MCNC: 112 MG/DL — HIGH (ref 70–99)
HDLC SERPL-MCNC: 36 MG/DL — LOW
HIV 1+2 AB+HIV1 P24 AG SERPL QL IA: SIGNIFICANT CHANGE UP
LIPID PNL WITH DIRECT LDL SERPL: 88 MG/DL — SIGNIFICANT CHANGE UP
MAGNESIUM SERPL-MCNC: 2.2 MG/DL — SIGNIFICANT CHANGE UP (ref 1.6–2.6)
NON HDL CHOLESTEROL: 117 MG/DL — SIGNIFICANT CHANGE UP
PHOSPHATE SERPL-MCNC: 4.2 MG/DL — SIGNIFICANT CHANGE UP (ref 2.5–4.5)
POTASSIUM SERPL-MCNC: 4.3 MMOL/L — SIGNIFICANT CHANGE UP (ref 3.5–5.3)
POTASSIUM SERPL-SCNC: 4.3 MMOL/L — SIGNIFICANT CHANGE UP (ref 3.5–5.3)
SODIUM SERPL-SCNC: 138 MMOL/L — SIGNIFICANT CHANGE UP (ref 135–145)
TRIGL SERPL-MCNC: 145 MG/DL — SIGNIFICANT CHANGE UP
TSH SERPL-MCNC: 3.51 UIU/ML — SIGNIFICANT CHANGE UP (ref 0.27–4.2)
VIT B12 SERPL-MCNC: 373 PG/ML — SIGNIFICANT CHANGE UP (ref 200–900)

## 2022-09-10 PROCEDURE — 93306 TTE W/DOPPLER COMPLETE: CPT | Mod: 26

## 2022-09-10 PROCEDURE — 99217: CPT

## 2022-09-10 PROCEDURE — 72141 MRI NECK SPINE W/O DYE: CPT | Mod: 26,MG

## 2022-09-10 PROCEDURE — 99284 EMERGENCY DEPT VISIT MOD MDM: CPT

## 2022-09-10 PROCEDURE — G1004: CPT

## 2022-09-10 PROCEDURE — 70551 MRI BRAIN STEM W/O DYE: CPT | Mod: 26,MG

## 2022-09-10 RX ORDER — LOSARTAN POTASSIUM 100 MG/1
0 TABLET, FILM COATED ORAL
Qty: 0 | Refills: 0 | DISCHARGE

## 2022-09-10 RX ORDER — AMLODIPINE BESYLATE 2.5 MG/1
0 TABLET ORAL
Qty: 0 | Refills: 0 | DISCHARGE

## 2022-09-10 RX ORDER — ATORVASTATIN CALCIUM 80 MG/1
80 TABLET, FILM COATED ORAL DAILY
Refills: 0 | Status: DISCONTINUED | OUTPATIENT
Start: 2022-09-10 | End: 2022-09-13

## 2022-09-10 RX ORDER — ASPIRIN/CALCIUM CARB/MAGNESIUM 324 MG
81 TABLET ORAL DAILY
Refills: 0 | Status: DISCONTINUED | OUTPATIENT
Start: 2022-09-10 | End: 2022-09-13

## 2022-09-10 RX ADMIN — Medication 81 MILLIGRAM(S): at 11:48

## 2022-09-10 RX ADMIN — ATORVASTATIN CALCIUM 80 MILLIGRAM(S): 80 TABLET, FILM COATED ORAL at 11:48

## 2022-09-10 NOTE — ED CDU PROVIDER SUBSEQUENT DAY NOTE - HISTORY
ZAHRA BRIDGES: Pt states that he is feeling only minimal numbness to left axilla and proximal arm; no dizziness, CP, or weakness. Pt advised of findings on telemetry (elva with pauses) - awaiting cardiology response (paged x1). Pt also awating MR Head/Neck and echo.

## 2022-09-10 NOTE — ED CDU PROVIDER SUBSEQUENT DAY NOTE - PROGRESS NOTE DETAILS
Case d/w cards who will be in to see patient; do not recommend any additional studies at this time, they will also speak with EP regarding tele findings. Cards at bedside to see patient.

## 2022-09-10 NOTE — ED ADULT NURSE REASSESSMENT NOTE - NS ED NURSE REASSESS COMMENT FT1
pt A&ox4, denies chest pain and SOB. no complaints of pain. denies headache, dizziness, lightheadedness, radiating chest pain. breathing is spontaneous and unlabored. NSR on telemetry. continuos cardiac and pulse ox monitoring in place.  No neurological deficits noted. as per PA, pt placed on Zoll due to bradycardic event. bed in lowest position, call bell within reach, siderails up x2. will continue to monitor.
pt elva to 31 with 3.2 second pause. PA Campbell aware. placed on ZOLL with pads for added precaution/
pt noted to bradycardia to 40s, as low as 34 while sleeping with 2.8 second pause. pt easily arousable to voice denies dizziness, lightheadedness. when awake, HR 60-80s NSR. ZAHRA Hightower aware.
report received from SATISH Newby, pt received as code stroke for decreases sensation in LUE. 18G IV right AC. comfort and safety measures provided.

## 2022-09-10 NOTE — ED CDU PROVIDER SUBSEQUENT DAY NOTE - MEDICAL DECISION MAKING DETAILS
Tele monitoring, neuro checks, MRI head/c-spine, additional recs as per Neuro team following patient; supportive care, general observation care / monitoring. Will consult cards/EP for bradycardia noted with pauses x 2. MR pending. Dispo pending.

## 2022-09-10 NOTE — CONSULT NOTE ADULT - ASSESSMENT
52M w/ HTN (noncompliant with medications), RICHA, R nephrolithiasis presents with LUE numbness and paresthesias being evaluated for CVA ruleout.    EP consulted for sinus bradycardia overnight with HR hannah 38 and noted sinus pauses x3, asymptomatic between 6:30-7:30 am this morning with highest one 3.8 seconds. Pt says he was in and out of sleep this morning cannot state whether he was awake but he denies any nazia symptoms this morning or overnight of chest pain. lightheadedness, dizziness, or presyncope. No EKG obtained during episodes overnight, but EKG on admission with NSR and .     - Continue to monitor on telemetry; given lack of symptoms and hemodynamic stability, no need for any additional interventions at thistime  - Avoid AV kenneth blocking agents 52M w/ HTN (noncompliant with medications), RIHCA, R nephrolithiasis presents with LUE numbness and paresthesias being evaluated for CVA ruleout.    EP consulted for sinus bradycardia overnight with HR hannah 38 and noted sinus pauses x3, asymptomatic between 6:30-7:30 am this morning with highest one 3.8 seconds. Pt says he was in and out of sleep this morning cannot state whether he was awake but he denies any nazia symptoms this morning or overnight of chest pain. lightheadedness, dizziness, or presyncope. No EKG obtained during episodes overnight, but EKG on admission with NSR and . He has untreated RICHA and this is a likely contributor to his sinus dysfunction and I spoke with him at length about pursuing CPAP therapy.    - Continue to monitor on telemetry while admitted; given lack of symptoms and hemodynamic stability, no need for any additional interventions at thistime  - Avoid AV kenneth blocking agents

## 2022-09-10 NOTE — ED CDU PROVIDER DISPOSITION NOTE - NSFOLLOWUPINSTRUCTIONS_ED_ALL_ED_FT
Cervical Radiculopathy    WHAT YOU NEED TO KNOW:    Cervical radiculopathy is a painful condition that happens when a spinal nerve in your neck is pinched or irritated.   Vertebral Column         DISCHARGE INSTRUCTIONS:    Medicines: You may need any of the following:  •NSAIDs help decrease swelling and pain. This medicine can be bought without a doctor's order. This medicine can cause stomach bleeding or kidney problems in certain people. If you take blood thinner medicine, always ask your provider if NSAIDs are safe for you. Always read the medicine label and follow the directions on it before using this medicine.      •Prescription pain medicine helps decrease pain. Do not wait until the pain is severe before you take this medicine.      •Steroids help decrease pain and swelling. These may be given as a pill or as an injection in your neck. You may need more than 1 injection if your symptoms do not improve after the first treatment.       •Take your medicine as directed. Contact your healthcare provider if you think your medicine is not helping or if you have side effects. Tell your provider if you are allergic to any medicine. Keep a list of the medicines, vitamins, and herbs you take. Include the amounts, and when and why you take them. Bring the list or the pill bottles to follow-up visits. Carry your medicine list with you in case of an emergency.      Follow up with your healthcare provider or spine specialist as directed: Write down your questions so you remember to ask them during your visits.     Physical therapy: Your provider may suggest physical therapy to stretch and strengthen your muscles. Your physical therapist can teach you how to improve your posture and the way you hold your neck. He or she may also teach you how to be safely active and avoid further injury. He can also help you develop an exercise program that is safe for your back and neck.     Self-care:   •Ice helps decrease swelling and pain. Ice may also help prevent tissue damage. Use an ice pack, or put crushed ice in a plastic bag. Cover it with a towel and place it on your neck for 15 to 20 minutes every hour or as directed.      •Rest when you feel it is needed. Slowly start to do more each day. Return to your daily activities as directed.       •Wear a soft collar. You may be given a soft collar to support your neck while you sleep. Wear the soft collar only as directed.  Cervical Collars           •Do light stretches and regular exercise. Your provider may suggest light stretches to help decrease stiffness in your neck and arm as you recover. After your pain is controlled, you may benefit from regular exercise. Ask what type of exercise is safe for your back and neck.       •Review your work area. A comfortable work area can help prevent neck strain. Ask your employer for an ergonomic review to check the position of your desk, chair, phone, and computer. Make any necessary adjustments for your comfort.       Contact your healthcare provider or spine specialist if:   •You have a fever.      •You are losing weight without trying.      •Your pain is worse, even with medicine.      •One or both hands feel more numb than before, or you cannot move your fingers well.      •You have questions or concerns about your condition or care.      Sleep Apnea    AMBULATORY CARE:    Sleep apnea is a condition that causes you to stop breathing often during sleep.    Types of sleep apnea:   •Obstructive sleep apnea (RICHA)  is the most common kind. The muscles and tissues around your throat relax and block air from passing through. Obesity, use of alcohol or cigarettes, or a family history are common causes. RICHA may increase your risk for complications after surgery.  Obstructive Sleep Apnea           •Central sleep apnea (CSA)  means your brain does not send signals to the muscles that control breathing. You do not take a breath even though your airway is open. Common causes include medical conditions such as heart failure, being older than 40, or use of opioids.      •Complex (or mixed) sleep apnea means you have both obstructive and central sleep apnea.      Common signs and symptoms:   •Loud snoring or long pauses in breathing      •Feeling sleepy, slow, and tired during the day      •Snorting, gasping, or choking while you sleep, and waking up suddenly because of these      •Feeling irritable during the day      •Dry mouth or a headache in the mornings      •Heavy night sweating      •A hard time thinking, remembering things, or focusing on your tasks the following day      Call your local emergency number (911 in the [place]) if:   •You have chest pain or trouble breathing.          Call your doctor if:   •You have new or worsening signs or symptoms.       •You have questions or concerns about your condition or care.      Treatment depends on the kind of apnea you have.  •A mouth device may be needed if you have mild sleep apnea. These are designed to keep your throat open. Ask your dentist or healthcare provider about the best mouth device for you.      •A machine may be used to help you get more air during sleep. A mask may be placed over your nose and mouth, or just your nose. The mask is hooked to the machine. You will get air through the mask.?A continuous positive airway pressure (CPAP) machine is used to keep your airway open during sleep. The machine blows a gentle stream of air into the mask when you breathe. This helps keep your airway open so you can breathe more regularly. Extra oxygen may be given through the machine.  CPAP           ?A bilevel positive airway pressure (BiPAP) machine gives air but lowers the pressure when you breathe out.      ?An adaptive servo-ventilator (ASV) is a machine that learns your usual breathing pattern. Then, it uses pressure to give you air and prevent stops in your breathing.      •Surgery to expand your airway or remove extra tissues may be needed. Surgery is usually only considered if other treatments do not work.      Manage or prevent sleep apnea:   •Reach and maintain a healthy weight. Ask your healthcare provider what a healthy weight is for you. Ask him or her to help you create a safe weight loss plan if you are overweight. Even a small goal of a 10% weight loss can improve your symptoms.      •Do not smoke. Nicotine and other chemicals in cigarettes and cigars can cause lung damage. Ask your healthcare provider for information if you currently smoke and need help to quit. E-cigarettes or smokeless tobacco still contain nicotine. Talk to your healthcare provider before you use these products.      •Do not drink alcohol or take sedative medicine before you go to sleep. Alcohol and sedatives can relax the muscles and tissues around your throat. This can block the airflow to your lungs.      •Sleep on your side or use pillows designed to prevent sleep apnea. This prevents your tongue or other tissues from blocking your throat. You can also raise the head of your bed.      Follow up with your doctor or specialist as directed: You may need to have blood tests during your follow-up visits. Work with your provider to find the right breathing support equipment and settings for you. Write down your questions so you remember to ask them during your visits.

## 2022-09-10 NOTE — ED CDU PROVIDER SUBSEQUENT DAY NOTE - NS ED ROS FT
ROS:  GENERAL: No fever, no chills  HEENT: no vision changes, no trouble swallowing, no trouble speaking  CARDIAC: no chest pain  PULMONARY: no cough, no shortness of breath  GI: no abdominal pain, no nausea, no vomiting, no diarrhea, no constipation  SKIN: no rashes  NEURO: As per HPI ; no headache, no weakness, no dizziness  MSK: No joint pain  All other systems reviewed as negative.

## 2022-09-10 NOTE — CONSULT NOTE ADULT - SUBJECTIVE AND OBJECTIVE BOX
Patient seen and evaluated at bedside    Reason for consult: sinus bradycardia with pauses    HPI:  52M w/ HTN (noncompliant with medications), RICHA, R nephrolithiasis presents with LUE numbness and paresthesias. Pt reports waking up normally at 5am and around 1pm noticed LUE paresthesias described as a "crawling" sensation, whole arm but more prominent distally. Pt reports some neck discomfort but no pain, no back pain, no urinary or bowel incontinence, no speech or vision changes. Pt reports taking amlodipine and losartan (which he normally is not compliant with) when the symptoms occurred. Pt also reports recurrence of his R flank pain. Not on antiplatelets or anticoagulants. At baseline, pt ambulates without assistance or assistive devices, does all ADLs independently.     EP consulted for sinus bradycardia overnight with HR hannah 38 and noted sinus pauses x3, asymptomatic between 6:30-7:30 am this morning with highest one 3.8 seconds. Pt says he was in and out of sleep this morning cannot state whether he was awake but he denies any nazia symptoms this morning or overnight of chest pain. lightheadedness, dizziness, or presyncope. No EKG obtained during episodes overnight, but EKG on admission with NSR and .       PMHx:   No pertinent past medical history    HTN (hypertension)    RICHA (obstructive sleep apnea)    Nephrolithiasis        PSHx:   No significant past surgical history        Allergies:  No Known Allergies      Home Meds:    Current Medications:   aspirin enteric coated 81 milliGRAM(s) Oral daily  atorvastatin 80 milliGRAM(s) Oral daily      FAMILY HISTORY:  No pertinent family history in first degree relatives        Social History:  Smoking History:  Alcohol Use:  Drug Use:    Review of Systems:  REVIEW OF SYSTEMS:  CONSTITUTIONAL: No weakness, fevers or chills  EYES/ENT: No visual changes;  No dysphagia  NECK: No pain or stiffness  RESPIRATORY: No cough, wheezing, hemoptysis; No shortness of breath  CARDIOVASCULAR: No chest pain or palpitations; No lower extremity edema  GASTROINTESTINAL: No abdominal or epigastric pain. No nausea, vomiting, or hematemesis; No diarrhea or constipation. No melena or hematochezia.  BACK: No back pain  GENITOURINARY: No dysuria, frequency or hematuria  NEUROLOGICAL: No numbness or weakness  SKIN: No itching, burning, rashes, or lesions   All other review of systems is negative unless indicated above.    [ ] All other systems negative  [ ] Unable to assess ROS due to    Physical Exam:  T(F): 97.9 (09-10), Max: 98.7 ()  HR: 66 (09-10) (64 - 87)  BP: 154/98 (09-10) (115/64 - 154/98)  RR: 17 (09-10)  SpO2: 97% (09-10)  GENERAL: No acute distress, well-developed  HEAD:  Atraumatic, Normocephalic  ENT: EOMI, PERRLA, conjunctiva and sclera clear, Neck supple, No JVD, moist mucosa  CHEST/LUNG: Clear to auscultation bilaterally; No wheeze, equal breath sounds bilaterally   BACK: No spinal tenderness  HEART: Regular rate and rhythm; No murmurs, rubs, or gallops  ABDOMEN: Soft, Nontender, Nondistended; Bowel sounds present  EXTREMITIES:  No clubbing, cyanosis, or edema  PSYCH: Nl behavior, nl affect  NEUROLOGY: AAOx3, non-focal, cranial nerves intact  SKIN: Normal color, No rashes or lesions  LINES:    Cardiovascular Diagnostic Testing:    CXR: Personally reviewed    Labs: Personally reviewed                        14.9   7.92  )-----------( 270      ( 09 Sep 2022 21:45 )             46.1     09-10    138  |  105  |  22  ----------------------------<  112<H>  4.3   |  21<L>  |  0.83    Ca    8.9      10 Sep 2022 05:50  Phos  4.2     09-10  Mg     2.20     09-10    TPro  7.2  /  Alb  4.3  /  TBili  0.4  /  DBili  x   /  AST  15  /  ALT  28  /  AlkPhos  67      PT/INR - ( 09 Sep 2022 21:45 )   PT: 11.5 sec;   INR: 0.99 ratio         PTT - ( 09 Sep 2022 21:45 )  PTT:29.6 sec    Total Cholesterol: 153  LDL: --  HDL: 36  T      Thyroid Stimulating Hormone, Serum: 3.51 uIU/mL (09-10 @ 05:50)

## 2022-09-10 NOTE — ED CDU PROVIDER SUBSEQUENT DAY NOTE - PHYSICAL EXAMINATION
CONSTITUTIONAL: Well-appearing; well-nourished; in no apparent distress. Non-toxic appearing.   NEURO: Alert, oriented x 3. CN II-XII grossly intact. No facial droop. Tongue protrudes midline. Strength to upper and lower extremities 5/5. No pronator drip.   PSYCH: Mood appropriate. Thought processes intact.   NECK: Supple  CARD: Regular rate and rhythm, no murmurs  RESP: No accessory muscle use; breath sounds clear and equal bilaterally; no wheezes, rhonchi, or rales     ABD: Soft; non-distended; non-tender.   MUSCULOSKELETAL/EXTREMITIES: FROM in all four extremities; no extremity edema.  SKIN: Warm; dry; no apparent lesions or exudate

## 2022-09-10 NOTE — ED CDU PROVIDER DISPOSITION NOTE - CLINICAL COURSE
53 y/o male with hx of HTN presented to ED on 9/9 c/o L arm paresthesias. Exam non-focal but code stroke called. CTA Head/Neck WNL. Sent to CDU for tele monitoring, neuro checks, MRI head/c-spine, additional recs as per Neuro team following patient; supportive care, general observation care / monitoring. Cards/EP consulted for bradycardia noted with pauses x 2 >> likely 2/2 to RICHA counseled regarding the need for sleep study/CPAP. MR Head/Neck significant for cervical spine arthritis/degenerative changes, no acute infarct. Echo WNL. Will d/c home with strict return precautions.

## 2022-09-10 NOTE — CONSULT NOTE ADULT - TIME BILLING
Spoke with Fellow regarding this patient's care this morning. I attempted to see the patient in the CDU this evening. The patient was discharged prior to me evaluating him. Indications for pacing in patients with sinus node dysfunction are driven by symptoms. As the patient was not having symptoms, there is no need for pacing. Would re-enforce importance of being compliant with CPAP.    NEY Hyman

## 2022-09-10 NOTE — ED CDU PROVIDER DISPOSITION NOTE - PATIENT PORTAL LINK FT
You can access the FollowMyHealth Patient Portal offered by [facility] by registering at the following website: [email or web address]. By joining [facility]’s FollowMyHealth portal, you will also be able to view your health information using other applications (apps) compatible with our system.

## 2022-09-13 LAB — ANA TITR SER: NEGATIVE — SIGNIFICANT CHANGE UP

## 2023-08-04 NOTE — ED CDU PROVIDER INITIAL DAY NOTE - CROS ED MUSC ALL NEG
[facility]  Medicine  [address], [place]   Phone: [phone]  Fax:   Follow Up Time: 1-3 Days    
- - -
